# Patient Record
Sex: MALE
[De-identification: names, ages, dates, MRNs, and addresses within clinical notes are randomized per-mention and may not be internally consistent; named-entity substitution may affect disease eponyms.]

---

## 2019-10-08 ENCOUNTER — APPOINTMENT (OUTPATIENT)
Dept: SURGERY | Facility: CLINIC | Age: 29
End: 2019-10-08
Payer: COMMERCIAL

## 2019-10-08 VITALS
HEIGHT: 69.25 IN | TEMPERATURE: 98.3 F | HEART RATE: 64 BPM | BODY MASS INDEX: 37.65 KG/M2 | WEIGHT: 257.13 LBS | OXYGEN SATURATION: 99 % | SYSTOLIC BLOOD PRESSURE: 144 MMHG | DIASTOLIC BLOOD PRESSURE: 85 MMHG

## 2019-10-08 DIAGNOSIS — L72.3 SEBACEOUS CYST: ICD-10-CM

## 2019-10-08 PROBLEM — Z00.00 ENCOUNTER FOR PREVENTIVE HEALTH EXAMINATION: Status: ACTIVE | Noted: 2019-10-08

## 2019-10-08 PROCEDURE — 99203 OFFICE O/P NEW LOW 30 MIN: CPT

## 2019-10-15 PROBLEM — L72.3 SEBACEOUS CYST: Status: ACTIVE | Noted: 2019-10-15

## 2019-10-15 NOTE — ASSESSMENT
[FreeTextEntry1] : 28 y/o M with no significant PMHx or PSHx, presenting for evaluation of a cyst to his left knee. On exam, the cyst was found to be on the left medial knee, no TTP. Given that the symptoms have improved, I advised the patient to proceed with monitoring the cyst for now. Informed the patient that he may obtain an US of the cyst to further evaluate it if he would like to. To follow up here as necessary for reevaluation, especially if symptoms develops again.

## 2019-10-15 NOTE — END OF VISIT
[FreeTextEntry3] : All medical record entries made by the Scribe were at my, Dr. Ramos's direction and personally dictated by me on 10/08/2019  I have reviewed the chart and agree that the record accurately reflects my personal performance of the history, physical exam, assessment and plan. I have also personally directed, reviewed, and agreed with the chart.\par \par

## 2019-10-15 NOTE — ADDENDUM
[FreeTextEntry1] : Documented by Elton Mckinnon acting as a scribe for Dr. Dean Ramos on 10/08/2019\par

## 2019-10-15 NOTE — HISTORY OF PRESENT ILLNESS
[de-identified] : 30 y/o M with no significant PMHx or PSHx presents here for evaluation of a cyst to his knee. He was referred here by Dr. Nessa Vazquez. He locates the cyst to his medial left knee. He states that it has been present since late August. He states that it was initially red, warm and tender but has now improved.\par